# Patient Record
Sex: MALE | Race: WHITE | NOT HISPANIC OR LATINO | Employment: FULL TIME | URBAN - METROPOLITAN AREA
[De-identification: names, ages, dates, MRNs, and addresses within clinical notes are randomized per-mention and may not be internally consistent; named-entity substitution may affect disease eponyms.]

---

## 2024-04-01 ENCOUNTER — OFFICE VISIT (OUTPATIENT)
Dept: URGENT CARE | Facility: CLINIC | Age: 39
End: 2024-04-01
Payer: COMMERCIAL

## 2024-04-01 VITALS
DIASTOLIC BLOOD PRESSURE: 86 MMHG | OXYGEN SATURATION: 97 % | BODY MASS INDEX: 29.8 KG/M2 | SYSTOLIC BLOOD PRESSURE: 148 MMHG | HEIGHT: 72 IN | RESPIRATION RATE: 18 BRPM | TEMPERATURE: 98 F | HEART RATE: 95 BPM | WEIGHT: 220 LBS

## 2024-04-01 DIAGNOSIS — J03.90 ACUTE TONSILLITIS, UNSPECIFIED ETIOLOGY: Primary | ICD-10-CM

## 2024-04-01 DIAGNOSIS — Z76.0 MEDICINE REFILL: ICD-10-CM

## 2024-04-01 PROBLEM — E78.2 MIXED HYPERLIPIDEMIA: Status: ACTIVE | Noted: 2021-10-27

## 2024-04-01 PROBLEM — G47.33 OSA (OBSTRUCTIVE SLEEP APNEA): Status: ACTIVE | Noted: 2023-11-07

## 2024-04-01 PROBLEM — K76.0 HEPATIC STEATOSIS: Status: ACTIVE | Noted: 2022-04-27

## 2024-04-01 PROBLEM — I10 PRIMARY HYPERTENSION: Status: ACTIVE | Noted: 2021-10-27

## 2024-04-01 PROBLEM — E55.9 VITAMIN D DEFICIENCY: Status: ACTIVE | Noted: 2022-03-15

## 2024-04-01 PROBLEM — R74.01 ELEVATED TRANSAMINASE LEVEL: Status: ACTIVE | Noted: 2021-10-27

## 2024-04-01 PROCEDURE — 99283 EMERGENCY DEPT VISIT LOW MDM: CPT | Performed by: NURSE PRACTITIONER

## 2024-04-01 PROCEDURE — G0382 LEV 3 HOSP TYPE B ED VISIT: HCPCS | Performed by: NURSE PRACTITIONER

## 2024-04-01 RX ORDER — PANTOPRAZOLE SODIUM 20 MG/1
20 TABLET, DELAYED RELEASE ORAL DAILY
Qty: 30 TABLET | Refills: 1 | Status: SHIPPED | OUTPATIENT
Start: 2024-04-01

## 2024-04-01 RX ORDER — PREDNISONE 20 MG/1
40 TABLET ORAL DAILY
Qty: 10 TABLET | Refills: 0 | Status: SHIPPED | OUTPATIENT
Start: 2024-04-01 | End: 2024-04-06

## 2024-04-01 RX ORDER — AMOXICILLIN AND CLAVULANATE POTASSIUM 875; 125 MG/1; MG/1
1 TABLET, FILM COATED ORAL EVERY 12 HOURS SCHEDULED
Qty: 20 TABLET | Refills: 0 | Status: SHIPPED | OUTPATIENT
Start: 2024-04-01 | End: 2024-04-11

## 2024-04-01 RX ORDER — LISINOPRIL 5 MG/1
5 TABLET ORAL DAILY
COMMUNITY
Start: 2023-11-16 | End: 2024-05-14

## 2024-04-01 NOTE — PATIENT INSTRUCTIONS
Tonsillitis   AMBULATORY CARE:   Tonsillitis  is inflammation of your tonsils. Tonsils are the lumps of tissue on both sides of the back of your throat. Tonsils are part of your immune system. They help you fight infections. Tonsillitis is usually caused by bacteria or a virus. Recurrent tonsillitis is tonsillitis that happens at least 5 times in 1 year. Chronic tonsillitis lasts 3 months or longer.       Common symptoms include the following:   Severe sore throat    Red, swollen tonsils    Painful swallowing    Fever and chills    Bad breath    White spots on the tonsils    Call your local emergency number (911 in the ) if:   You have trouble breathing because your tonsils are swollen.      Seek care immediately if:   You are not able to eat or drink because of the pain.    Your voice changes.    You have increased swelling or jaw pain, or you have trouble opening your mouth.    You have a stiff neck.    You have not urinated in 12 hours or are very weak or tired.    You have trouble sleeping and wake up trying to catch your breath.    Call your doctor if:   You have a fever.    Your pain gets worse or does not get better after you take pain medicine.    Your sore throat is not better after you have finished antibiotic treatment.    You have questions or concerns about your condition or care.    Treatment for tonsillitis  may include any of the following:  Acetaminophen  decreases pain and fever. It is available without a doctor's order. Ask how much to take and how often to take it. Follow directions. Read the labels of all other medicines you are using to see if they also contain acetaminophen, or ask your doctor or pharmacist. Acetaminophen can cause liver damage if not taken correctly.    NSAIDs , such as ibuprofen, help decrease swelling, pain, and fever. This medicine is available with or without a doctor's order. NSAIDs can cause stomach bleeding or kidney problems in certain people. If you take blood  thinner medicine, always ask your healthcare provider if NSAIDs are safe for you. Always read the medicine label and follow directions.    Antibiotics  help treat a bacterial infection.    Steroids  may be given for a short time to relieve swelling.    A tonsillectomy  is surgery to remove your tonsils. You may need surgery if you have chronic or recurrent tonsillitis. Surgery is also done if antibiotics are not getting rid of your tonsillitis.    Rest when you feel it is needed:  Slowly start to do more each day.  Drink liquids as directed:  You may need to drink more liquid than usual to help prevent dehydration. Ask how much liquid to drink each day and which liquids are best for you.  Gargle with warm salt water:  This may help decrease throat pain. Mix 1 teaspoon of salt in 8 ounces of warm water. Ask how often you should do this.  Prevent tonsillitis:  Bacteria and viruses that lead to tonsillitis can spread through coughing, sneezing, or touching. The following can help prevent infections:  Wash your hands often.  Wash your hands several times each day. Wash after you use the bathroom, change a child's diaper, and before you prepare or eat food. Use soap and water. Rinse with warm, running water for several seconds. Then dry your hands with a clean towel or paper towel. Use hand  that contains alcohol if soap and water are not available. Do not touch your eyes, nose, or mouth without washing your hands first.         Cover a sneeze or cough.  Use a tissue that covers your mouth and nose. Throw the tissue away immediately. If you do not have a tissue, use the bend of your elbow. Then wash your hands well or use a hand .    Prevent person-to-person spread of germs.  Do not share food or drinks with anyone. Return to work, school, or other activities as directed. Your provider may want you to wait until your fever is gone for at least 24 hours.    Ask about vaccines you may need.  Vaccines help  protect you from some bacterial and viral infections. Get the influenza (flu) vaccine as soon as recommended each year, usually in September or October. Get a COVID-19 vaccine and recommended boosters. Get a pneumonia vaccine, if recommended. The pneumonia vaccine is usually recommended every 5 years. Your provider will tell you which other vaccines you need, and when to get them.    Follow up with your doctor as directed:  Write down your questions so you remember to ask them during your visits.  © Copyright [employer] 2023 Information is for End User's use only and may not be sold, redistributed or otherwise used for commercial purposes.  The above information is an  only. It is not intended as medical advice for individual conditions or treatments. Talk to your doctor, nurse or pharmacist before following any medical regimen to see if it is safe and effective for you.

## 2024-04-01 NOTE — PROGRESS NOTES
[facility]        NAME: Adan Carpenter is a 38 y.o. male  : 1985    MRN: [id number]  DATE: 2024  TIME: 4:57 PM      Assessment and Plan     Acute tonsillitis, unspecified etiology [J03.90]  1. Acute tonsillitis, unspecified etiology  amoxicillin-clavulanate (AUGMENTIN) 875-125 mg per tablet    predniSONE 20 mg tablet      2. Medicine refill  pantoprazole (PROTONIX) 20 mg tablet            Patient Instructions     Patient Instructions   Tonsillitis   AMBULATORY CARE:   Tonsillitis  is inflammation of your tonsils. Tonsils are the lumps of tissue on both sides of the back of your throat. Tonsils are part of your immune system. They help you fight infections. Tonsillitis is usually caused by bacteria or a virus. Recurrent tonsillitis is tonsillitis that happens at least 5 times in 1 year. Chronic tonsillitis lasts 3 months or longer.       Common symptoms include the following:   Severe sore throat    Red, swollen tonsils    Painful swallowing    Fever and chills    Bad breath    White spots on the tonsils    Call your local emergency number (911 in the ) if:   You have trouble breathing because your tonsils are swollen.      Seek care immediately if:   You are not able to eat or drink because of the pain.    Your voice changes.    You have increased swelling or jaw pain, or you have trouble opening your mouth.    You have a stiff neck.    You have not urinated in 12 hours or are very weak or tired.    You have trouble sleeping and wake up trying to catch your breath.    Call your doctor if:   You have a fever.    Your pain gets worse or does not get better after you take pain medicine.    Your sore throat is not better after you have finished antibiotic treatment.    You have questions or concerns about your condition or care.    Treatment for tonsillitis  may include any of the following:  Acetaminophen  decreases pain and fever. It is available without a doctor's order. Ask how much to  take and how often to take it. Follow directions. Read the labels of all other medicines you are using to see if they also contain acetaminophen, or ask your doctor or pharmacist. Acetaminophen can cause liver damage if not taken correctly.    NSAIDs , such as ibuprofen, help decrease swelling, pain, and fever. This medicine is available with or without a doctor's order. NSAIDs can cause stomach bleeding or kidney problems in certain people. If you take blood thinner medicine, always ask your healthcare provider if NSAIDs are safe for you. Always read the medicine label and follow directions.    Antibiotics  help treat a bacterial infection.    Steroids  may be given for a short time to relieve swelling.    A tonsillectomy  is surgery to remove your tonsils. You may need surgery if you have chronic or recurrent tonsillitis. Surgery is also done if antibiotics are not getting rid of your tonsillitis.    Rest when you feel it is needed:  Slowly start to do more each day.  Drink liquids as directed:  You may need to drink more liquid than usual to help prevent dehydration. Ask how much liquid to drink each day and which liquids are best for you.  Gargle with warm salt water:  This may help decrease throat pain. Mix 1 teaspoon of salt in 8 ounces of warm water. Ask how often you should do this.  Prevent tonsillitis:  Bacteria and viruses that lead to tonsillitis can spread through coughing, sneezing, or touching. The following can help prevent infections:  Wash your hands often.  Wash your hands several times each day. Wash after you use the bathroom, change a child's diaper, and before you prepare or eat food. Use soap and water. Rinse with warm, running water for several seconds. Then dry your hands with a clean towel or paper towel. Use hand  that contains alcohol if soap and water are not available. Do not touch your eyes, nose, or mouth without washing your hands first.         Cover a sneeze or cough.   Use a tissue that covers your mouth and nose. Throw the tissue away immediately. If you do not have a tissue, use the bend of your elbow. Then wash your hands well or use a hand .    Prevent person-to-person spread of germs.  Do not share food or drinks with anyone. Return to work, school, or other activities as directed. Your provider may want you to wait until your fever is gone for at least 24 hours.    Ask about vaccines you may need.  Vaccines help protect you from some bacterial and viral infections. Get the influenza (flu) vaccine as soon as recommended each year, usually in September or October. Get a COVID-19 vaccine and recommended boosters. Get a pneumonia vaccine, if recommended. The pneumonia vaccine is usually recommended every 5 years. Your provider will tell you which other vaccines you need, and when to get them.    Follow up with your doctor as directed:  Write down your questions so you remember to ask them during your visits.  © Copyright [employer] 2023 Information is for End User's use only and may not be sold, redistributed or otherwise used for commercial purposes.  The above information is an  only. It is not intended as medical advice for individual conditions or treatments. Talk to your doctor, nurse or pharmacist before following any medical regimen to see if it is safe and effective for you.      Follow up with PCP in 3-5 days.  Proceed to  ER if symptoms worsen.    Chief Complaint     Chief Complaint   Patient presents with    Earache    Cold Like Symptoms     Sore throat, earache, body aches, fever, congestions all came on Friday           History of Present Illness     On Friday felt like he was coming down with something.  Had mild symptoms over the weekend.  Overnight last night felt worse--subjective fever, chills, congestion, body aches, sore throat, coughing, slight diarrhea.    Is in the process of switching PCPs due to insurance coverage changing.  Has an  upcoming new patient appointment.  Was on protonix 20 mg daily with good control of GERD but has been out with increasing symptoms help but not fully controlled by prn Tums.        Review of Systems     Review of Systems   Constitutional:  Positive for chills, fatigue and fever.   HENT:  Positive for congestion, ear pain and sore throat.    Respiratory:  Positive for cough, chest tightness, shortness of breath and wheezing.         Current smoker; remote hx bronchitis--last time a few years ago with covid 2021.  No hx asthma   Gastrointestinal:  Positive for diarrhea. Negative for nausea and vomiting.   Musculoskeletal:  Positive for myalgias.   All other systems reviewed and are negative.        Current Medications       Current Outpatient Medications:     amoxicillin-clavulanate (AUGMENTIN) 875-125 mg per tablet, Take 1 tablet by mouth every 12 (twelve) hours for 10 days, Disp: 20 tablet, Rfl: 0    lisinopril (ZESTRIL) 5 mg tablet, Take 5 mg by mouth daily, Disp: , Rfl:     pantoprazole (PROTONIX) 20 mg tablet, Take 1 tablet (20 mg total) by mouth daily, Disp: 30 tablet, Rfl: 1    predniSONE 20 mg tablet, Take 2 tablets (40 mg total) by mouth daily for 5 days, Disp: 10 tablet, Rfl: 0    Current Allergies     Allergies as of 04/01/2024    (No Known Allergies)              The following portions of the patient's history were reviewed and updated as appropriate: allergies, current medications, past family history, past medical history, past social history, past surgical history and problem list.     History reviewed. No pertinent past medical history.    History reviewed. No pertinent surgical history.    History reviewed. No pertinent family history.      Medications have been verified.        Objective     /86   Pulse 95   Temp 98 °F (36.7 °C)   Resp 18   Ht 6' (1.829 m)   Wt 99.8 kg (220 lb)   SpO2 97%   BMI 29.84 kg/m²   No LMP for male patient.         Physical Exam     Physical Exam  Vitals and  nursing note reviewed.   Constitutional:       General: He is not in acute distress.     Appearance: Normal appearance. He is well-developed and well-groomed. He is ill-appearing (mild). He is not toxic-appearing or diaphoretic.   HENT:      Head: Normocephalic and atraumatic.      Right Ear: Hearing, ear canal and external ear normal. No decreased hearing noted. Tenderness present. A middle ear effusion (slight) is present. Tympanic membrane is not erythematous or bulging.      Left Ear: Hearing, ear canal and external ear normal. No decreased hearing noted. No tenderness. A middle ear effusion (slight) is present. Tympanic membrane is not erythematous or bulging.      Nose: Mucosal edema and congestion present.      Right Sinus: No maxillary sinus tenderness or frontal sinus tenderness.      Left Sinus: No maxillary sinus tenderness or frontal sinus tenderness.      Mouth/Throat:      Mouth: Mucous membranes are moist.      Pharynx: Oropharynx is clear. Uvula midline. Posterior oropharyngeal erythema (very red) present. No oropharyngeal exudate.      Tonsils: No tonsillar exudate or tonsillar abscesses. 2+ on the right. 1+ on the left.   Eyes:      Pupils: Pupils are equal, round, and reactive to light.   Cardiovascular:      Rate and Rhythm: Normal rate and regular rhythm. No extrasystoles are present.     Heart sounds: Normal heart sounds, S1 normal and S2 normal. No murmur heard.     No friction rub. No gallop.   Pulmonary:      Effort: Pulmonary effort is normal. No tachypnea, bradypnea, accessory muscle usage, prolonged expiration, respiratory distress or retractions.      Breath sounds: Normal breath sounds and air entry. No stridor or decreased air movement. No decreased breath sounds, wheezing (clear, good air movement), rhonchi or rales.   Chest:      Chest wall: No tenderness.   Abdominal:      General: Bowel sounds are normal. There is no distension.      Palpations: Abdomen is soft.      Tenderness:  There is no abdominal tenderness. There is no guarding or rebound.   Musculoskeletal:         General: Normal range of motion.      Cervical back: Normal range of motion and neck supple.   Lymphadenopathy:      Cervical: Cervical adenopathy (R>L) present.   Skin:     General: Skin is warm and dry.      Capillary Refill: Capillary refill takes less than 2 seconds.   Neurological:      General: No focal deficit present.      Mental Status: He is alert and oriented to person, place, and time.   Psychiatric:         Mood and Affect: Mood normal.         Behavior: Behavior normal. Behavior is cooperative.         Thought Content: Thought content normal.         Judgment: Judgment normal.

## 2024-04-16 ENCOUNTER — OFFICE VISIT (OUTPATIENT)
Dept: FAMILY MEDICINE CLINIC | Facility: CLINIC | Age: 39
End: 2024-04-16
Payer: COMMERCIAL

## 2024-04-16 VITALS
HEART RATE: 110 BPM | HEIGHT: 72 IN | WEIGHT: 225 LBS | DIASTOLIC BLOOD PRESSURE: 120 MMHG | BODY MASS INDEX: 30.48 KG/M2 | OXYGEN SATURATION: 98 % | SYSTOLIC BLOOD PRESSURE: 170 MMHG | TEMPERATURE: 97.2 F

## 2024-04-16 DIAGNOSIS — I10 PRIMARY HYPERTENSION: Primary | ICD-10-CM

## 2024-04-16 DIAGNOSIS — Z76.89 ENCOUNTER TO ESTABLISH CARE WITH NEW DOCTOR: ICD-10-CM

## 2024-04-16 DIAGNOSIS — R05.3 CHRONIC COUGH: ICD-10-CM

## 2024-04-16 DIAGNOSIS — Z72.0 TOBACCO ABUSE: ICD-10-CM

## 2024-04-16 DIAGNOSIS — K21.00 GASTROESOPHAGEAL REFLUX DISEASE WITH ESOPHAGITIS WITHOUT HEMORRHAGE: ICD-10-CM

## 2024-04-16 DIAGNOSIS — G47.33 OSA (OBSTRUCTIVE SLEEP APNEA): ICD-10-CM

## 2024-04-16 PROCEDURE — 99204 OFFICE O/P NEW MOD 45 MIN: CPT | Performed by: FAMILY MEDICINE

## 2024-04-16 RX ORDER — ALBUTEROL SULFATE 90 UG/1
2 AEROSOL, METERED RESPIRATORY (INHALATION) EVERY 6 HOURS PRN
Qty: 8.5 G | Refills: 0 | Status: SHIPPED | OUTPATIENT
Start: 2024-04-16

## 2024-04-16 RX ORDER — IBUPROFEN 200 MG
TABLET ORAL
COMMUNITY

## 2024-04-16 RX ORDER — LOSARTAN POTASSIUM 25 MG/1
25 TABLET ORAL DAILY
Qty: 90 TABLET | Refills: 0 | Status: SHIPPED | OUTPATIENT
Start: 2024-04-16

## 2024-04-16 NOTE — PROGRESS NOTES
Name: Adan Carpenter      : 1985      MRN: [id number]  Encounter Provider: Joseph Coto MD  Encounter Date: 2024   Encounter department: [facility]    Assessment & Plan     1. Primary hypertension  -     losartan (COZAAR) 25 mg tablet; Take 1 tablet (25 mg total) by mouth daily    2. Gastroesophageal reflux disease with esophagitis without hemorrhage  -     Ambulatory Referral to Gastroenterology; Future    3. Chronic cough  -     XR chest pa & lateral; Future; Expected date: 2024  -     albuterol (ProAir HFA) 90 mcg/act inhaler; Inhale 2 puffs every 6 (six) hours as needed for wheezing    4. TORI (obstructive sleep apnea)  Assessment & Plan:  Not on CPAP yet.  He reports DME company waiting to hear from insurance.      5. Tobacco abuse    6. Encounter to establish care with new doctor    Unclear etiology to patient's cough but I suspect likely related to lisinopril.  Recommended he stop lisinopril we will switch to losartan.  Blood pressure is uncontrolled today so we will continue to monitor and uptitrate meds until blood pressure at goal.  He also has significant history of acid reflux as well as tobacco use so his cough may be related to those chronic conditions.  I have advised if cough does not resolve in 2 weeks after stopping lisinopril that he get chest x-ray and also follow-up with GI for further evaluation and endoscopy.  Continue Protonix 20 mg daily as he reports symptoms controlled.  He takes Advil frequently for arthralgia advised to limit NSAID use .     There is some wheezing on exam today I also suspect he has undiagnosed COPD.  Will trial albuterol as needed.       Subjective      Patient presents to the office today to establish care.  Has history of hypertension on lisinopril.  He also has concerns for cough today.  Has had cough for about 6 months.    He reports the cough is dry and he is not really sure what is causing it.  Nothing really makes the  cough worse and nothing really makes the cough better.  He does have a significant smoking history and smokes both cigarettes and vapes.  Reports has been smoking for about 20 years.  He does want to quit and has had tried several medications both patches and oral medications to quit but that did not really help.  He does admit some shortness of breath at times.  He has never been on inhaler.  His cough did start shortly after lisinopril.  He has history of acid reflux and he says it gets really bad at times but ever since getting on pantoprazole 20 mg daily his symptoms are much better.  He said he is waking up in the past with regurgitated food and acid reflux with burning throat and has vomited.  Denies any hematemesis.      Review of Systems   All other systems reviewed and are negative.      Current Outpatient Medications on File Prior to Visit   Medication Sig    ibuprofen (MOTRIN) 200 mg tablet Take by mouth    pantoprazole (PROTONIX) 20 mg tablet Take 1 tablet (20 mg total) by mouth daily    [DISCONTINUED] lisinopril (ZESTRIL) 5 mg tablet Take 5 mg by mouth daily       Objective     BP (!) 170/120 (BP Location: Left arm, Patient Position: Sitting, Cuff Size: Standard)   Pulse (!) 110   Temp (!) 97.2 °F (36.2 °C) (Tympanic)   Ht 6' (1.829 m)   Wt 102 kg (225 lb)   SpO2 98%   BMI 30.52 kg/m²     Physical Exam  Vitals and nursing note reviewed.   Constitutional:       General: He is not in acute distress.     Appearance: Normal appearance. He is not ill-appearing, toxic-appearing or diaphoretic.   HENT:      Nose: Nose normal.      Mouth/Throat:      Mouth: Mucous membranes are moist.      Pharynx: Oropharynx is clear. No oropharyngeal exudate or posterior oropharyngeal erythema.   Eyes:      General:         Right eye: No discharge.         Left eye: No discharge.      Extraocular Movements: Extraocular movements intact.      Conjunctiva/sclera: Conjunctivae normal.   Cardiovascular:      Rate and  Rhythm: Normal rate.   Pulmonary:      Effort: Pulmonary effort is normal.      Breath sounds: Wheezing present.   Musculoskeletal:      Cervical back: Normal range of motion and neck supple. No rigidity or tenderness.   Lymphadenopathy:      Cervical: No cervical adenopathy.   Neurological:      Mental Status: He is alert and oriented to person, place, and time.   Psychiatric:         Mood and Affect: Mood normal.         Behavior: Behavior normal.         Thought Content: Thought content normal.         Judgment: Judgment normal.       Joseph Coto MD

## 2024-05-16 ENCOUNTER — TELEPHONE (OUTPATIENT)
Dept: FAMILY MEDICINE CLINIC | Facility: CLINIC | Age: 39
End: 2024-05-16

## 2024-05-16 DIAGNOSIS — Z76.0 MEDICINE REFILL: ICD-10-CM

## 2024-05-16 RX ORDER — PANTOPRAZOLE SODIUM 20 MG/1
20 TABLET, DELAYED RELEASE ORAL DAILY
Qty: 90 TABLET | Refills: 1 | Status: SHIPPED | OUTPATIENT
Start: 2024-05-16

## 2024-05-16 NOTE — TELEPHONE ENCOUNTER
Patient was in the office asking if it would be possible to get a refill of his pantoprazole. He stated that it was last filled by urgent care but that he will be out before hios appointment. Is it possible to have his medication filled.

## 2024-05-17 ENCOUNTER — TELEPHONE (OUTPATIENT)
Dept: FAMILY MEDICINE CLINIC | Facility: CLINIC | Age: 39
End: 2024-05-17

## 2024-05-20 ENCOUNTER — OFFICE VISIT (OUTPATIENT)
Dept: URGENT CARE | Facility: CLINIC | Age: 39
End: 2024-05-20
Payer: COMMERCIAL

## 2024-05-20 VITALS
OXYGEN SATURATION: 97 % | BODY MASS INDEX: 30.48 KG/M2 | TEMPERATURE: 97.6 F | SYSTOLIC BLOOD PRESSURE: 140 MMHG | HEART RATE: 116 BPM | WEIGHT: 225 LBS | HEIGHT: 72 IN | DIASTOLIC BLOOD PRESSURE: 86 MMHG | RESPIRATION RATE: 18 BRPM

## 2024-05-20 DIAGNOSIS — J02.0 STREP PHARYNGITIS: Primary | ICD-10-CM

## 2024-05-20 DIAGNOSIS — J02.9 SORE THROAT: ICD-10-CM

## 2024-05-20 LAB — S PYO AG THROAT QL: POSITIVE

## 2024-05-20 PROCEDURE — 99283 EMERGENCY DEPT VISIT LOW MDM: CPT | Performed by: ORTHOPAEDIC SURGERY

## 2024-05-20 PROCEDURE — G0382 LEV 3 HOSP TYPE B ED VISIT: HCPCS | Performed by: ORTHOPAEDIC SURGERY

## 2024-05-20 PROCEDURE — 87880 STREP A ASSAY W/OPTIC: CPT | Performed by: ORTHOPAEDIC SURGERY

## 2024-05-20 RX ORDER — AMOXICILLIN 500 MG/1
500 CAPSULE ORAL EVERY 12 HOURS SCHEDULED
Qty: 20 CAPSULE | Refills: 0 | Status: SHIPPED | OUTPATIENT
Start: 2024-05-20 | End: 2024-05-30

## 2024-05-20 NOTE — PROGRESS NOTES
"  [facility]        NAME: Adan Carpenter is a 38 y.o. male  : 1985    MRN: [id number]  DATE: May 20, 2024  TIME: 1:37 PM    Assessment and Plan   Strep pharyngitis [J02.0]  1. Strep pharyngitis  amoxicillin (AMOXIL) 500 mg capsule      2. Sore throat  POCT rapid ANTIGEN strepA        POCT strep positive.    Patient Instructions     Take antibiotics as prescribed  For pain relief you may try:  Warm water and salt gargles  Chloraseptic spray  Cepacol lozenges  \"Throat Coat\" tea  Over-the-counter Tylenol/ibuprofen for pain and fever  Stay well hydrated and get plenty of rest  Following completion of antibiotics it may be beneficial to throw out your toothbrush for a new one as it is possible to re-infect yourself  Follow up with your PCP in 3-5 days  Proceed to ER if symptoms worsen        If tests are performed, our office will contact you with results only if changes need to made to the care plan discussed with you at the visit. [facility]    Chief Complaint     Chief Complaint   Patient presents with    Sore Throat     Patient c/o fever, sore throat, nausea and body aches that started on Saturday.         History of Present Illness       38 YOM presents to the urgent care for evaluation of a sore throat, sweats/fever, nausea. Symptoms started Saturday and have been worsening. The patient notes a loss of appetite. Swollen, painful throat and glands. He reports there was contact with illness through work.         Review of Systems   Review of Systems   Constitutional:  Positive for appetite change, chills, fatigue and fever.   HENT:  Positive for sore throat. Negative for ear pain.    Eyes:  Negative for pain and visual disturbance.   Respiratory:  Negative for cough and shortness of breath.    Cardiovascular:  Negative for chest pain and palpitations.   Gastrointestinal:  Positive for nausea. Negative for abdominal pain and vomiting.   Genitourinary:  " Negative for dysuria and hematuria.   Musculoskeletal:  Positive for myalgias. Negative for arthralgias and back pain.   Skin:  Negative for color change and rash.   Neurological:  Negative for dizziness, seizures, syncope and headaches.   All other systems reviewed and are negative.        Current Medications       Current Outpatient Medications:     amoxicillin (AMOXIL) 500 mg capsule, Take 1 capsule (500 mg total) by mouth every 12 (twelve) hours for 10 days, Disp: 20 capsule, Rfl: 0    ibuprofen (MOTRIN) 200 mg tablet, Take by mouth, Disp: , Rfl:     losartan (COZAAR) 25 mg tablet, Take 1 tablet (25 mg total) by mouth daily, Disp: 90 tablet, Rfl: 0    pantoprazole (PROTONIX) 20 mg tablet, Take 1 tablet (20 mg total) by mouth daily, Disp: 90 tablet, Rfl: 1    albuterol (ProAir HFA) 90 mcg/act inhaler, Inhale 2 puffs every 6 (six) hours as needed for wheezing (Patient not taking: Reported on 5/20/2024), Disp: 8.5 g, Rfl: 0    Current Allergies     Allergies as of 05/20/2024    (No Known Allergies)            The following portions of the patient's history were reviewed and updated as appropriate: allergies, current medications, past family history, past medical history, past social history, past surgical history and problem list.     History reviewed. No pertinent past medical history.    History reviewed. No pertinent surgical history.    No family history on file.      Medications have been verified.        Objective   /86   Pulse (!) 116   Temp 97.6 °F (36.4 °C) (Temporal)   Resp 18   Ht 6' (1.829 m)   Wt 102 kg (225 lb)   SpO2 97%   BMI 30.52 kg/m²        Physical Exam     Physical Exam  Vitals and nursing note reviewed.   Constitutional:       General: He is not in acute distress.     Appearance: Normal appearance. He is diaphoretic. He is not ill-appearing.   HENT:      Head: Normocephalic and atraumatic.      Right Ear: Tympanic membrane normal.      Left Ear: Tympanic membrane normal.       Nose: Nose normal.      Mouth/Throat:      Mouth: Mucous membranes are moist.      Pharynx: Oropharynx is clear. Uvula midline. Posterior oropharyngeal erythema present. No oropharyngeal exudate.      Tonsils: Tonsillar exudate present. 1+ on the right. 1+ on the left.   Eyes:      Extraocular Movements: Extraocular movements intact.      Pupils: Pupils are equal, round, and reactive to light.   Cardiovascular:      Rate and Rhythm: Normal rate and regular rhythm.      Pulses: Normal pulses.      Heart sounds: Normal heart sounds. No murmur heard.  Pulmonary:      Effort: Pulmonary effort is normal. No respiratory distress.      Breath sounds: Normal breath sounds. No wheezing or rhonchi.   Abdominal:      Palpations: Abdomen is soft.      Tenderness: There is no abdominal tenderness.   Musculoskeletal:         General: Normal range of motion.      Cervical back: Normal range of motion.   Lymphadenopathy:      Cervical: Cervical adenopathy present.   Skin:     General: Skin is warm.      Capillary Refill: Capillary refill takes less than 2 seconds.   Neurological:      General: No focal deficit present.      Mental Status: He is alert and oriented to person, place, and time.   Psychiatric:         Mood and Affect: Mood normal.         Behavior: Behavior normal.

## 2024-05-20 NOTE — PATIENT INSTRUCTIONS
"Take antibiotics as prescribed  For pain relief you may try:  Warm water and salt gargles  Chloraseptic spray  Cepacol lozenges  \"Throat Coat\" tea  Over-the-counter Tylenol/ibuprofen for pain and fever  Stay well hydrated and get plenty of rest  Following completion of antibiotics it may be beneficial to throw out your toothbrush for a new one as it is possible to re-infect yourself  Follow up with your PCP in 3-5 days  Proceed to ER if symptoms worsen      "

## 2024-05-20 NOTE — LETTER
May 20, 2024     Patient: Adan Carpenter   YOB: 1985   Date of Visit: 5/20/2024       To Whom It May Concern:    It is my medical opinion that Adan Carpenter may return to work on Wednesday, 5/22/2024 .    If you have any questions or concerns, please don't hesitate to call.         Sincerely,        Saira Rushing PA-C    CC: No Recipients

## 2024-05-30 ENCOUNTER — PATIENT OUTREACH (OUTPATIENT)
Dept: OTHER | Facility: CLINIC | Age: 39
End: 2024-05-30

## 2024-05-30 ENCOUNTER — OFFICE VISIT (OUTPATIENT)
Dept: FAMILY MEDICINE CLINIC | Facility: CLINIC | Age: 39
End: 2024-05-30
Payer: COMMERCIAL

## 2024-05-30 DIAGNOSIS — Z00.00 ANNUAL PHYSICAL EXAM: Primary | ICD-10-CM

## 2024-05-30 DIAGNOSIS — R05.3 CHRONIC COUGH: ICD-10-CM

## 2024-05-30 DIAGNOSIS — Z72.0 TOBACCO ABUSE: ICD-10-CM

## 2024-05-30 DIAGNOSIS — I10 PRIMARY HYPERTENSION: ICD-10-CM

## 2024-05-30 DIAGNOSIS — K21.00 GASTROESOPHAGEAL REFLUX DISEASE WITH ESOPHAGITIS WITHOUT HEMORRHAGE: ICD-10-CM

## 2024-05-30 PROCEDURE — 99395 PREV VISIT EST AGE 18-39: CPT | Performed by: FAMILY MEDICINE

## 2024-05-30 PROCEDURE — 99214 OFFICE O/P EST MOD 30 MIN: CPT | Performed by: FAMILY MEDICINE

## 2024-05-30 RX ORDER — LOSARTAN POTASSIUM 50 MG/1
50 TABLET ORAL DAILY
Qty: 90 TABLET | Refills: 0 | Status: SHIPPED | OUTPATIENT
Start: 2024-05-30

## 2024-05-30 NOTE — PROGRESS NOTES
Ambulatory Visit  Name: Adan Carpenter      : 1985      MRN: [id number]  Encounter Provider: Joseph Coto MD  Encounter Date: 2024   Encounter department: FAMILY [facility]ACTICE AT [place]    Assessment & Plan   1. Annual physical exam  2. Primary hypertension  -     losartan (COZAAR) 50 mg tablet; Take 1 tablet (50 mg total) by mouth daily  3. Tobacco abuse  -     Ambulatory Referral to Smoking Cessation Program; Future  4. Chronic cough  5. Gastroesophageal reflux disease with esophagitis without hemorrhage    Patient's chronic cough has resolved after switching lisinopril to losartan.  At previous visit we ordered chest x-ray which he did not complete because the cough went away.  I told him it is okay to hold on chest x-ray for now since cough is resolved but recommended getting it done if it comes back.  He does have significant smoking history and  I do suspect there is some underlying mild  COPD here but sxs are stable.  I did send albuterol to the pharmacy at last visit but he did not  inhaler yet.  He is motivated to quit smoking but is going to be starting CPAP soon for sleep apnea.  He says he may take 1 thing at a time and start CPAP and then focus on tobacco cessation and improving his diet and lifestyle.  Blood pressure is improved but still slightly  above goal.  I believe CPAP , smoking cessation and improvement in his lifestyle will resolve this.      GERD stable on pantoprazole 20 mg daily.      History of Present Illness     Patient returns office today for annual exam and follow-up from initial office visit.  Last office visit we were working up his chronic cough.  He was on lisinopril and switch to losartan.  We also trialed albuterol inhaler and ordered chest x-ray.  He says his cough went away after switching the lisinopril so he did not get the chest x-ray.  He did go to the pharmacy but did not  the albuterol because he said it was 20 bucks and did not want to  pay at that time and wanted to see if he actually needed.  He does have some intermittent shortness of breath but says he is not very active and smokes a lot of cigarettes.  He is motivated to quit and get in better shape.  He does not really exercise too much and has  a sedentary job.  He is going to be starting CPAP soon.  He is not having issues with the losartan.  His acid reflux is also stable.  He is on pantoprazole 20 mg daily.        Review of Systems   All other systems reviewed and are negative.      Objective     /82 (BP Location: Left arm, Patient Position: Sitting, Cuff Size: Standard)   Pulse 88   Temp (!) 97.4 °F (36.3 °C) (Tympanic)   Ht 6' (1.829 m)   Wt 104 kg (229 lb)   SpO2 97%   BMI 31.06 kg/m²     Physical Exam  Vitals and nursing note reviewed.   Constitutional:       General: He is not in acute distress.     Appearance: Normal appearance. He is well-developed. He is not ill-appearing, toxic-appearing or diaphoretic.   HENT:      Head: Normocephalic and atraumatic.      Right Ear: External ear normal.      Left Ear: External ear normal.      Nose: Nose normal.      Mouth/Throat:      Mouth: Mucous membranes are moist.      Pharynx: Oropharynx is clear. No oropharyngeal exudate or posterior oropharyngeal erythema.   Eyes:      General: No scleral icterus.        Right eye: No discharge.         Left eye: No discharge.      Extraocular Movements: Extraocular movements intact.      Conjunctiva/sclera: Conjunctivae normal.      Pupils: Pupils are equal, round, and reactive to light.   Cardiovascular:      Rate and Rhythm: Normal rate and regular rhythm.      Pulses: Normal pulses.      Heart sounds: Normal heart sounds. No murmur heard.  Pulmonary:      Effort: Pulmonary effort is normal. No respiratory distress.      Breath sounds: Normal breath sounds.   Abdominal:      General: There is no distension.      Palpations: Abdomen is soft. There is no mass.      Tenderness: There is no  abdominal tenderness. There is no guarding or rebound.      Hernia: No hernia is present.   Musculoskeletal:         General: Normal range of motion.      Cervical back: Normal range of motion and neck supple. No rigidity or tenderness.      Right lower leg: No edema.      Left lower leg: No edema.   Lymphadenopathy:      Cervical: No cervical adenopathy.   Skin:     General: Skin is warm and dry.   Neurological:      General: No focal deficit present.      Mental Status: He is alert and oriented to person, place, and time.      Cranial Nerves: No cranial nerve deficit.      Motor: No weakness.      Gait: Gait normal.   Psychiatric:         Mood and Affect: Mood normal.         Behavior: Behavior normal.         Thought Content: Thought content normal.         Judgment: Judgment normal.       Administrative Statements

## 2024-06-01 VITALS
HEIGHT: 72 IN | TEMPERATURE: 97.4 F | OXYGEN SATURATION: 97 % | BODY MASS INDEX: 31.02 KG/M2 | DIASTOLIC BLOOD PRESSURE: 82 MMHG | WEIGHT: 229 LBS | SYSTOLIC BLOOD PRESSURE: 140 MMHG | HEART RATE: 88 BPM

## 2024-07-01 DIAGNOSIS — Z76.0 MEDICINE REFILL: ICD-10-CM

## 2024-07-02 ENCOUNTER — TELEPHONE (OUTPATIENT)
Age: 39
End: 2024-07-02

## 2024-07-02 DIAGNOSIS — G47.33 OSA (OBSTRUCTIVE SLEEP APNEA): Primary | ICD-10-CM

## 2024-07-02 RX ORDER — PANTOPRAZOLE SODIUM 20 MG/1
20 TABLET, DELAYED RELEASE ORAL DAILY
Qty: 100 TABLET | Refills: 1 | Status: SHIPPED | OUTPATIENT
Start: 2024-07-02

## 2024-07-02 NOTE — TELEPHONE ENCOUNTER
Patient reports that the provider that initially ordered his CPAP is no longer in his network so he would like a call back to advise if his PCP can continue to order his CPAP equipment through [facility] in [place]. Patient would like a call back to advise.

## 2024-09-03 ENCOUNTER — OFFICE VISIT (OUTPATIENT)
Dept: FAMILY MEDICINE CLINIC | Facility: CLINIC | Age: 39
End: 2024-09-03
Payer: COMMERCIAL

## 2024-09-03 VITALS
BODY MASS INDEX: 31.42 KG/M2 | SYSTOLIC BLOOD PRESSURE: 138 MMHG | WEIGHT: 232 LBS | DIASTOLIC BLOOD PRESSURE: 90 MMHG | HEART RATE: 98 BPM | OXYGEN SATURATION: 97 % | TEMPERATURE: 97.1 F | HEIGHT: 72 IN

## 2024-09-03 DIAGNOSIS — I10 PRIMARY HYPERTENSION: ICD-10-CM

## 2024-09-03 DIAGNOSIS — G47.33 OSA (OBSTRUCTIVE SLEEP APNEA): Primary | ICD-10-CM

## 2024-09-03 DIAGNOSIS — Z72.0 TOBACCO ABUSE: ICD-10-CM

## 2024-09-03 PROCEDURE — 99214 OFFICE O/P EST MOD 30 MIN: CPT | Performed by: FAMILY MEDICINE

## 2024-09-03 RX ORDER — LOSARTAN POTASSIUM 50 MG/1
50 TABLET ORAL DAILY
Qty: 90 TABLET | Refills: 0 | Status: SHIPPED | OUTPATIENT
Start: 2024-09-03

## 2024-09-03 NOTE — ASSESSMENT & PLAN NOTE
Still smoking.  Encouraged follow-up with [facility] tobacco cessation program as referred at previous visit

## 2024-09-03 NOTE — ASSESSMENT & PLAN NOTE
Improving but still borderline.  We will continue losartan 50 mg daily.  He will start being more compliant with CPAP which should improve his blood pressures as well.  He will monitor his blood pressures over the next few weeks and send me readings.

## 2024-09-03 NOTE — PROGRESS NOTES
Ambulatory Visit  Name: Adan Carpenter      : 1985      MRN: [id number]  Encounter Provider: Joseph Coto MD  Encounter Date: 9/3/2024   Encounter department: [facility]ILY PRACTICE AT [place]    Assessment & Plan   1. TORI (obstructive sleep apnea)  Assessment & Plan:  He is on CPAP but has been having difficulty with the machine.  Following with pulmonology [facility] for management of sleep apnea but has not been able to get a hold of anyone at the office.  Will place new referral to sleep medicine for TORI management.  Orders:  -     Ambulatory Referral to Sleep Medicine; Future  2. Primary hypertension  Assessment & Plan:  Improving but still borderline.  We will continue losartan 50 mg daily.  He will start being more compliant with CPAP which should improve his blood pressures as well.  He will monitor his blood pressures over the next few weeks and send me readings.  3. Tobacco abuse  Assessment & Plan:  Still smoking.  Encouraged follow-up with [facility] tobacco cessation program as referred at previous visit       History of Present Illness     Patient presents to the office today for follow-up.  Has history of hypertension and obstructive sleep apnea.  Has been having trouble with his CPAP.  Wakes up in the [place] and feels like he has to take it off.  He cannot breathe and it is blowing air down his mouth and the pressure is too high.  He tried calling his sleep apnea doctor but they have not responded to him.  He is on losartan for hypertension.  No issues after we increased losartan to 50 mg daily.  Does not measure blood pressures at home.  He still smoking cigarettes.  He did get a call from [facility] tobacco cessation as I referred him last visit but he did not set anything up.        Review of Systems   All other systems reviewed and are negative.      Objective     /90 (BP Location: Left arm, Patient Position: Sitting, Cuff Size: Standard)   Pulse 98    Temp (!) 97.1 °F (36.2 °C) (Tympanic)   Ht 6' (1.829 m)   Wt 105 kg (232 lb)   SpO2 97%   BMI 31.46 kg/m²     Physical Exam  Vitals and nursing note reviewed.   Constitutional:       General: He is not in acute distress.     Appearance: Normal appearance. He is well-developed. He is not ill-appearing, toxic-appearing or diaphoretic.   HENT:      Head: Normocephalic and atraumatic.   Eyes:      General:         Right eye: No discharge.         Left eye: No discharge.      Extraocular Movements: Extraocular movements intact.      Conjunctiva/sclera: Conjunctivae normal.   Cardiovascular:      Rate and Rhythm: Normal rate.      Pulses:           Dorsalis pedis pulses are 2+ on the right side and 2+ on the left side.        Posterior tibial pulses are 2+ on the right side and 2+ on the left side.   Pulmonary:      Effort: Pulmonary effort is normal.   Musculoskeletal:      Cervical back: Normal range of motion and neck supple.   Feet:      Right foot:      Skin integrity: No ulcer, skin breakdown, erythema, warmth, callus or dry skin.      Left foot:      Skin integrity: No ulcer, skin breakdown, erythema, warmth, callus or dry skin.   Skin:     General: Skin is dry.      Capillary Refill: Capillary refill takes less than 2 seconds.   Neurological:      Mental Status: He is alert and oriented to person, place, and time.   Psychiatric:         Mood and Affect: Mood normal.         Behavior: Behavior normal.         Thought Content: Thought content normal.         Judgment: Judgment normal.       Administrative Statements

## 2024-09-03 NOTE — ASSESSMENT & PLAN NOTE
He is on CPAP but has been having difficulty with the machine.  Following with pulmonology [facility] for management of sleep apnea but has not been able to get a hold of anyone at the office.  Will place new referral to sleep medicine for TORI management.

## 2024-11-22 DIAGNOSIS — Z76.0 MEDICINE REFILL: ICD-10-CM

## 2024-11-22 DIAGNOSIS — I10 PRIMARY HYPERTENSION: ICD-10-CM

## 2024-11-22 RX ORDER — LOSARTAN POTASSIUM 50 MG/1
50 TABLET ORAL DAILY
Qty: 90 TABLET | Refills: 0 | Status: SHIPPED | OUTPATIENT
Start: 2024-11-22

## 2024-11-22 RX ORDER — PANTOPRAZOLE SODIUM 20 MG/1
20 TABLET, DELAYED RELEASE ORAL DAILY
Qty: 100 TABLET | Refills: 1 | Status: SHIPPED | OUTPATIENT
Start: 2024-11-22

## 2025-01-24 ENCOUNTER — OFFICE VISIT (OUTPATIENT)
Dept: URGENT CARE | Facility: CLINIC | Age: 40
End: 2025-01-24
Payer: COMMERCIAL

## 2025-01-24 VITALS
OXYGEN SATURATION: 97 % | HEART RATE: 110 BPM | WEIGHT: 230 LBS | BODY MASS INDEX: 31.19 KG/M2 | RESPIRATION RATE: 16 BRPM | DIASTOLIC BLOOD PRESSURE: 92 MMHG | TEMPERATURE: 98.7 F | SYSTOLIC BLOOD PRESSURE: 142 MMHG

## 2025-01-24 DIAGNOSIS — R68.89 FLU-LIKE SYMPTOMS: Primary | ICD-10-CM

## 2025-01-24 DIAGNOSIS — J02.9 SORE THROAT: ICD-10-CM

## 2025-01-24 LAB — S PYO AG THROAT QL: NEGATIVE

## 2025-01-24 PROCEDURE — 99283 EMERGENCY DEPT VISIT LOW MDM: CPT | Performed by: ORTHOPAEDIC SURGERY

## 2025-01-24 PROCEDURE — 87880 STREP A ASSAY W/OPTIC: CPT | Performed by: ORTHOPAEDIC SURGERY

## 2025-01-24 PROCEDURE — G0382 LEV 3 HOSP TYPE B ED VISIT: HCPCS | Performed by: ORTHOPAEDIC SURGERY

## 2025-01-24 RX ORDER — OSELTAMIVIR PHOSPHATE 75 MG/1
75 CAPSULE ORAL EVERY 12 HOURS SCHEDULED
Qty: 10 CAPSULE | Refills: 0 | Status: SHIPPED | OUTPATIENT
Start: 2025-01-24 | End: 2025-01-29

## 2025-01-24 NOTE — LETTER
January 24, 2025     Patient: Adan Carpenter   YOB: 1985   Date of Visit: 1/24/2025       To Whom It May Concern:    It is my medical opinion that Adan Carpenter may return to work on Monday, 1/27/2025 .    If you have any questions or concerns, please don't hesitate to call.         Sincerely,        Saira Rushing PA-C    CC: No Recipients

## 2025-01-24 NOTE — PROGRESS NOTES
[facility]        NAME: Adan Carpenter is a 39 y.o. male  : 1985    MRN: [id number]  DATE: 2025  TIME: 5:57 PM    Assessment and Plan   Flu-like symptoms [R68.89]  1. Flu-like symptoms  oseltamivir (TAMIFLU) 75 mg capsule        POCT strep negative.  Patient declined COVID/flu testing.     Patient Instructions       Most upper respiratory infections are viral and resolve on their own within 10-14 days. Antibiotics are not indicated for the viral infection, and are only prescribed if there is evidence for a bacterial infection. Viral infections are the most common, with bacterial infections only accounting for 0.5-2 percent of cases. Sometimes an upper respiratory infection may lead to secondary bacterial infection, such as bacterial sinusitis, in which case antibiotics would be indicated at that time. If your symptoms continue beyond 10-14 days or if you experience ongoing fevers, productive cough with green, brown, bloody phlegm production, you may have developed a bacterial infection. For the uncomplicated viral upper respiratory infection conservative management includes:    Fever and pain control:  Ibuprofen (Motrin) 600mg every 6 hours for fever, headaches, body aches   Ibuprofen is an NSAID. Please stop medication if you experience stomach/abdominal pain and report to your primary care provider.   Ask your primary care provider before you take NSAIDs if you are on any blood thinners, or if you have a history of heart disease, kidney disease, gastric bypass surgery, GI bleed, or poorly controlled high blood pressure.   May use acetaminophen (Tylenol) as directed on the bottle between doses of ibuprofen. Do not exceed 4,000mg of Tylenol a day.   Cough & Congestion:  Guaifenesin (Mucinex) as directed on the bottle for congestion and mucous-y cough.   Dextromethorphan (Delsym, Robitussin) for dry cough and cough suppression   Pseudoephedrine (Sudafed) for congestion and sinus pressure    Sudafed may cause increased heart rate, irregular heart rate, and an increase in blood pressure. Please do not take Sudafed if you have a history of heart disease or high blood pressure.   Sudafed should not be taken if you are on anti-depressants such as those belonging to the class MAOIs or tricyclics.  Coricidin HBP (chlorpheniramine maleate) can be used as a decongestant in place of other options for those unable to take Sudafed.   Combination cough and cold such as Dimetapp and Mucinex DM also available  Sudafed PE Head Congestion +Flu Severe contains a combination of Sudafed, Tylenol, Mucinex, and Delsym  If prescribed, take Tessalon Pearles or Bromfed/Phenergan DM as directed  Avoid taking prescription cough/congestion medication and OTC options at the same time  Sore Throat:  Cepacol lozenges  Chloraseptic spray  Throat Coat tea  Warm salt water gargles   Vitamin/Minerals:  Vitamin D3 2,000 IU daily  Vitamin C 1000mg twice a day  Some studies suggest that Zinc 12.5-15mg every 2 hours while awake for 5 days may shorten symptom duration by 1-2 days  Other:   Plenty of fluids and rest  Cool mist humidifiers  Nasal sinus rinses such as NettiPot, Neimed, or Navage can be used to help flush out sinuses  Please only use distilled/sterile water that can be purchased at your local pharmacy  Nasal spray options:  Nasal steroid sprays such as Flonase, Nasonex, Nasacort may help with sinus congestion, itchy/watery eyes, clogged ears  These options must be used consistently for at least 2 weeks for full effect  Afrin nasal spray for quick acting congestion relief  Saline nasal spray for dry nose, irritation of the nasal passages  Follow up with PCP in 3-5 days  Proceed to the ED if symptoms worsen      If tests are performed, our office will contact you with results only if changes need to made to the care plan discussed with you at the visit. You can review your full results on [facility] Mychart.    Chief Complaint      Chief Complaint   Patient presents with    Earache     B/L earache, congestion , raspy voice  cough , started a month ago  daughter has the Flu         History of Present Illness       39-year-old male presents to the urgent care for evaluation of sinus pressure, congestion, body aches, sweats and chills.  Symptoms started yesterday, though he reports that for the past month he has been experiencing congestion and cold-like symptoms.  He notes overall the symptoms were getting better up until yesterday when symptoms worsened.  He notes his daughter is currently home sick.  He denies any recorded fevers.  He denies any shortness of breath, but notes that it feels like his breathing is shallow.  He is a heavy smoker, denies any history of asthma.  He denies any nausea, vomiting, diarrhea.  For symptom relief he has been taking Advil, Tylenol PM, Flonase, Robitussin.        Review of Systems   Review of Systems   Constitutional:  Positive for chills, diaphoresis and fatigue. Negative for fever.   HENT:  Positive for congestion, rhinorrhea, sinus pressure and sore throat. Negative for ear pain.    Eyes:  Negative for pain and visual disturbance.   Respiratory:  Positive for cough. Negative for shortness of breath.    Cardiovascular:  Negative for chest pain and palpitations.   Gastrointestinal:  Negative for abdominal pain, diarrhea, nausea and vomiting.   Genitourinary:  Negative for dysuria and hematuria.   Musculoskeletal:  Positive for myalgias. Negative for arthralgias and back pain.   Skin:  Negative for color change and rash.   Neurological:  Positive for headaches. Negative for dizziness, seizures and syncope.   All other systems reviewed and are negative.        Current Medications       Current Outpatient Medications:     ibuprofen (MOTRIN) 200 mg tablet, Take by mouth, Disp: , Rfl:     losartan (COZAAR) 50 mg tablet, Take 1 tablet (50 mg total) by mouth daily, Disp: 90 tablet, Rfl: 0    oseltamivir  (TAMIFLU) 75 mg capsule, Take 1 capsule (75 mg total) by mouth every 12 (twelve) hours for 5 days, Disp: 10 capsule, Rfl: 0    pantoprazole (PROTONIX) 20 mg tablet, Take 1 tablet (20 mg total) by mouth daily, Disp: 100 tablet, Rfl: 1    albuterol (ProAir HFA) 90 mcg/act inhaler, Inhale 2 puffs every 6 (six) hours as needed for wheezing (Patient not taking: Reported on 5/20/2024), Disp: 8.5 g, Rfl: 0    Current Allergies     Allergies as of 01/24/2025    (No Known Allergies)            The following portions of the patient's history were reviewed and updated as appropriate: allergies, current medications, past family history, past medical history, past social history, past surgical history and problem list.     Past Medical History:   Diagnosis Date    Hypertension        History reviewed. No pertinent surgical history.    History reviewed. No pertinent family history.      Medications have been verified.        Objective   /97   Pulse (!) 110   Temp 98.7 °F (37.1 °C)   Resp 16   Wt 104 kg (230 lb)   SpO2 97%   BMI 31.19 kg/m²        Physical Exam     Physical Exam  Vitals and nursing note reviewed.   Constitutional:       General: He is not in acute distress.     Appearance: Normal appearance. He is not ill-appearing.   HENT:      Head: Normocephalic and atraumatic.      Right Ear: Tympanic membrane normal.      Left Ear: Tympanic membrane normal.      Nose: Nose normal.      Mouth/Throat:      Mouth: Mucous membranes are moist.      Pharynx: Oropharynx is clear. Posterior oropharyngeal erythema (petechiae) present. No oropharyngeal exudate.   Eyes:      Extraocular Movements: Extraocular movements intact.      Pupils: Pupils are equal, round, and reactive to light.   Cardiovascular:      Rate and Rhythm: Normal rate and regular rhythm.      Pulses: Normal pulses.      Heart sounds: Normal heart sounds. No murmur heard.  Pulmonary:      Effort: Pulmonary effort is normal. No respiratory distress.       Breath sounds: Normal breath sounds. No wheezing or rhonchi.   Abdominal:      Palpations: Abdomen is soft.      Tenderness: There is no abdominal tenderness.   Musculoskeletal:         General: Normal range of motion.      Cervical back: Normal range of motion.   Lymphadenopathy:      Cervical: No cervical adenopathy.   Skin:     General: Skin is warm and dry.      Capillary Refill: Capillary refill takes less than 2 seconds.   Neurological:      General: No focal deficit present.      Mental Status: He is alert and oriented to person, place, and time.   Psychiatric:         Mood and Affect: Mood normal.         Behavior: Behavior normal.

## 2025-01-27 ENCOUNTER — DOCUMENTATION (OUTPATIENT)
Dept: ADMINISTRATIVE | Facility: OTHER | Age: 40
End: 2025-01-27

## 2025-01-27 NOTE — PROGRESS NOTES
Saira Rushing PA-C  P Patient Reported Team         Blood pressure elevated  Appointment department: [facility]  Appointment provider: Saira Rushing PA-C  Blood pressure  01/24/25 1841 142/92  01/24/25 1703 143/97    01/27/25 11:47 AM    Patient was called after the Urgent Care visit Patient declined to schedule appointment.    Thank you.  Juanis Aburto  [facility]

## 2025-02-17 DIAGNOSIS — I10 PRIMARY HYPERTENSION: ICD-10-CM

## 2025-02-18 DIAGNOSIS — I10 PRIMARY HYPERTENSION: Primary | ICD-10-CM

## 2025-02-18 RX ORDER — LOSARTAN POTASSIUM 50 MG/1
50 TABLET ORAL DAILY
Qty: 90 TABLET | Refills: 0 | Status: SHIPPED | OUTPATIENT
Start: 2025-02-18

## 2025-03-07 ENCOUNTER — APPOINTMENT (OUTPATIENT)
Dept: LAB | Facility: CLINIC | Age: 40
End: 2025-03-07
Payer: COMMERCIAL

## 2025-03-07 DIAGNOSIS — I10 PRIMARY HYPERTENSION: ICD-10-CM

## 2025-03-07 LAB
ALBUMIN SERPL BCG-MCNC: 4.3 G/DL (ref 3.5–5)
ALP SERPL-CCNC: 98 U/L (ref 34–104)
ALT SERPL W P-5'-P-CCNC: 189 U/L (ref 7–52)
ANION GAP SERPL CALCULATED.3IONS-SCNC: 11 MMOL/L (ref 4–13)
AST SERPL W P-5'-P-CCNC: 207 U/L (ref 13–39)
BILIRUB SERPL-MCNC: 0.95 MG/DL (ref 0.2–1)
BUN SERPL-MCNC: 10 MG/DL (ref 5–25)
CALCIUM SERPL-MCNC: 9.3 MG/DL (ref 8.4–10.2)
CHLORIDE SERPL-SCNC: 100 MMOL/L (ref 96–108)
CHOLEST SERPL-MCNC: 223 MG/DL (ref ?–200)
CO2 SERPL-SCNC: 26 MMOL/L (ref 21–32)
CREAT SERPL-MCNC: 0.7 MG/DL (ref 0.6–1.3)
GFR SERPL CREATININE-BSD FRML MDRD: 119 ML/MIN/1.73SQ M
GLUCOSE P FAST SERPL-MCNC: 152 MG/DL (ref 65–99)
HDLC SERPL-MCNC: 34 MG/DL
LDLC SERPL CALC-MCNC: 158 MG/DL (ref 0–100)
NONHDLC SERPL-MCNC: 189 MG/DL
POTASSIUM SERPL-SCNC: 3.9 MMOL/L (ref 3.5–5.3)
PROT SERPL-MCNC: 8.6 G/DL (ref 6.4–8.4)
SODIUM SERPL-SCNC: 137 MMOL/L (ref 135–147)
TRIGL SERPL-MCNC: 155 MG/DL (ref ?–150)

## 2025-03-07 PROCEDURE — 80053 COMPREHEN METABOLIC PANEL: CPT

## 2025-03-07 PROCEDURE — 80061 LIPID PANEL: CPT

## 2025-03-07 PROCEDURE — 36415 COLL VENOUS BLD VENIPUNCTURE: CPT

## 2025-03-12 ENCOUNTER — RESULTS FOLLOW-UP (OUTPATIENT)
Dept: FAMILY MEDICINE CLINIC | Facility: CLINIC | Age: 40
End: 2025-03-12

## 2025-03-13 ENCOUNTER — OFFICE VISIT (OUTPATIENT)
Dept: FAMILY MEDICINE CLINIC | Facility: CLINIC | Age: 40
End: 2025-03-13
Payer: COMMERCIAL

## 2025-03-13 ENCOUNTER — APPOINTMENT (OUTPATIENT)
Dept: LAB | Facility: CLINIC | Age: 40
End: 2025-03-13
Payer: COMMERCIAL

## 2025-03-13 VITALS
SYSTOLIC BLOOD PRESSURE: 142 MMHG | DIASTOLIC BLOOD PRESSURE: 94 MMHG | TEMPERATURE: 96.6 F | BODY MASS INDEX: 31.69 KG/M2 | OXYGEN SATURATION: 99 % | WEIGHT: 234 LBS | HEIGHT: 72 IN | RESPIRATION RATE: 18 BRPM | HEART RATE: 110 BPM

## 2025-03-13 DIAGNOSIS — I10 PRIMARY HYPERTENSION: ICD-10-CM

## 2025-03-13 DIAGNOSIS — K76.0 HEPATIC STEATOSIS: ICD-10-CM

## 2025-03-13 DIAGNOSIS — E78.2 MIXED HYPERLIPIDEMIA: ICD-10-CM

## 2025-03-13 DIAGNOSIS — K76.0 HEPATIC STEATOSIS: Primary | ICD-10-CM

## 2025-03-13 LAB
EST. AVERAGE GLUCOSE BLD GHB EST-MCNC: 166 MG/DL
HBA1C MFR BLD: 7.4 %

## 2025-03-13 PROCEDURE — 99214 OFFICE O/P EST MOD 30 MIN: CPT | Performed by: FAMILY MEDICINE

## 2025-03-13 PROCEDURE — 83036 HEMOGLOBIN GLYCOSYLATED A1C: CPT

## 2025-03-13 PROCEDURE — 36415 COLL VENOUS BLD VENIPUNCTURE: CPT

## 2025-03-14 ENCOUNTER — RESULTS FOLLOW-UP (OUTPATIENT)
Dept: FAMILY MEDICINE CLINIC | Facility: CLINIC | Age: 40
End: 2025-03-14

## 2025-03-21 NOTE — ASSESSMENT & PLAN NOTE
Ultrasound ordered today.  Referral to hepatology also placed.  Will get A1c for further evaluation.  Patient admits to almost daily beer consumption.  This ranges from 6 beers to 20.  He says some days he does not drink.  I have recommended he slowly wean off beer.  Offered medication assisted therapy however patient declined.  I also recommended dietary modification.  Orders:    US elastography/UGAP; Future    Ambulatory Referral to Hepatology; Future    Hemoglobin A1C; Future

## 2025-03-21 NOTE — PROGRESS NOTES
Name: Adan Carpenter      : 1985      MRN: [id number]  Encounter Provider: Joseph Coto MD  Encounter Date: 3/13/2025   Encounter department: [facility]  :  Assessment & Plan  Hepatic steatosis         Ultrasound ordered today.  Referral to hepatology also placed.  Will get A1c for further evaluation.  Patient admits to almost daily beer consumption.  This ranges from 6 beers to 20.  He says some days he does not drink.  I have recommended he slowly wean off beer.  Offered medication assisted therapy however patient declined.  I also recommended dietary modification.  Orders:    US elastography/UGAP; Future    Ambulatory Referral to Hepatology; Future    Hemoglobin A1C; Future    Primary hypertension  Uncontrolled.  Will continue current meds and see if Bps improve with diet and lifestyle modification.        Mixed hyperlipidemia  Elevations in lipid panel with triglycerides, total cholesterol and LDL.  Due to his elevated liver enzymes cannot start statin.  I recommend dietary lifestyle modification.  We will continue to monitor.                History of Present Illness   Patient comes office today for chronic follow-up and follow-up on recent labs.  Liver enzymes are high.  They have been high for many years now.  He admits to beer drinking almost daily.  Sometimes can drink 6 beers a day and sometimes up to 20.  He does not have a good diet either.  He also has history of hypertension as well as obstructive sleep apnea.  He is on CPAP for sleep apnea.      Review of Systems   All other systems reviewed and are negative.      Objective   /94 (BP Location: Left arm, Patient Position: Sitting, Cuff Size: Large)   Pulse (!) 110   Temp (!) 96.6 °F (35.9 °C) (Tympanic)   Resp 18   Ht 6' (1.829 m)   Wt 106 kg (234 lb)   SpO2 99%   BMI 31.74 kg/m²      Physical Exam  Vitals and nursing note reviewed.   Constitutional:       General: He is not in acute  distress.     Appearance: Normal appearance. He is well-developed. He is not ill-appearing, toxic-appearing or diaphoretic.   HENT:      Head: Normocephalic and atraumatic.   Eyes:      General:         Right eye: No discharge.         Left eye: No discharge.      Extraocular Movements: Extraocular movements intact.      Conjunctiva/sclera: Conjunctivae normal.   Cardiovascular:      Rate and Rhythm: Normal rate.      Pulses:           Dorsalis pedis pulses are 2+ on the right side and 2+ on the left side.        Posterior tibial pulses are 2+ on the right side and 2+ on the left side.   Pulmonary:      Effort: Pulmonary effort is normal.   Musculoskeletal:      Cervical back: Normal range of motion and neck supple.   Feet:      Right foot:      Skin integrity: No ulcer, skin breakdown, erythema, warmth, callus or dry skin.      Left foot:      Skin integrity: No ulcer, skin breakdown, erythema, warmth, callus or dry skin.   Skin:     General: Skin is dry.      Capillary Refill: Capillary refill takes less than 2 seconds.   Neurological:      Mental Status: He is alert and oriented to person, place, and time.   Psychiatric:         Mood and Affect: Mood normal.         Behavior: Behavior normal.         Thought Content: Thought content normal.         Judgment: Judgment normal.

## 2025-03-23 NOTE — ASSESSMENT & PLAN NOTE
Uncontrolled.  Will continue current meds and see if Bps improve with diet and lifestyle modification.

## 2025-03-23 NOTE — ASSESSMENT & PLAN NOTE
Elevations in lipid panel with triglycerides, total cholesterol and LDL.  Due to his elevated liver enzymes cannot start statin.  I recommend dietary lifestyle modification.  We will continue to monitor.

## 2025-03-28 DIAGNOSIS — E11.9 TYPE 2 DIABETES MELLITUS WITHOUT COMPLICATION, WITHOUT LONG-TERM CURRENT USE OF INSULIN (HCC): Primary | ICD-10-CM

## 2025-03-28 NOTE — TELEPHONE ENCOUNTER
Patient stated he just missed a call. Advised Yessenia left a detailed message. Dr Coto stated he has a few minutes around 2:00 PM today. Patient stated he will have his phone on him and will wait for phone call.

## 2025-03-28 NOTE — TELEPHONE ENCOUNTER
Pt called reporting that he seen Dr. Coto on 3/13/25 and then had labs completed.    He received a call from Dr. Casiano on 3/14 stating that his A1C was high (7.4) and that pt now has diabetes but pt was unable to discuss with Dr. Coto at the time and was told that he would receive a call back within the next week to discuss.     Pt reporting that he never received a call-back.  Pt needs to know what his next steps would be.    Please advise - [phone]

## 2025-03-28 NOTE — PROGRESS NOTES
Spoke to patient about recent A1c results.  Will start metformin.  Side effects discussed.  Prescription sent to pharmacy.  He was in the car and could not schedule appointment.  He will call back on Monday and schedule 3-month follow-up appointment.  We will recheck A1c here in the office.

## 2025-04-22 ENCOUNTER — OFFICE VISIT (OUTPATIENT)
Dept: GASTROENTEROLOGY | Facility: CLINIC | Age: 40
End: 2025-04-22
Payer: COMMERCIAL

## 2025-04-22 VITALS
SYSTOLIC BLOOD PRESSURE: 152 MMHG | RESPIRATION RATE: 17 BRPM | OXYGEN SATURATION: 96 % | DIASTOLIC BLOOD PRESSURE: 90 MMHG | HEART RATE: 90 BPM | WEIGHT: 231 LBS | BODY MASS INDEX: 31.29 KG/M2 | TEMPERATURE: 97.7 F | HEIGHT: 72 IN

## 2025-04-22 DIAGNOSIS — K76.0 HEPATIC STEATOSIS: ICD-10-CM

## 2025-04-22 DIAGNOSIS — R74.8 ELEVATED LIVER ENZYMES: Primary | ICD-10-CM

## 2025-04-22 DIAGNOSIS — Z72.0 TOBACCO USE: ICD-10-CM

## 2025-04-22 DIAGNOSIS — Z79.1 NSAID LONG-TERM USE: ICD-10-CM

## 2025-04-22 DIAGNOSIS — F10.90 ALCOHOL USE: ICD-10-CM

## 2025-04-22 PROCEDURE — 99204 OFFICE O/P NEW MOD 45 MIN: CPT | Performed by: FAMILY MEDICINE

## 2025-04-22 RX ORDER — SODIUM CHLORIDE, SODIUM LACTATE, POTASSIUM CHLORIDE, CALCIUM CHLORIDE 600; 310; 30; 20 MG/100ML; MG/100ML; MG/100ML; MG/100ML
125 INJECTION, SOLUTION INTRAVENOUS CONTINUOUS
OUTPATIENT
Start: 2025-04-22

## 2025-04-22 NOTE — ASSESSMENT & PLAN NOTE
Refer to A/P above.   Orders:  •  Ambulatory Referral to Hepatology  •  CBC and differential; Future  •  Comprehensive metabolic panel; Future  •  Protime-INR; Future  •  Alpha 1 Antitrypsin Phenotype; Future  •  МАРИНА Screen w/Reflex Cascade; Future  •  Antimitochondrial antibody; Future  •  Anti-smooth muscle antibody, IgG; Future  •  IgG, IgA, IgM; Future  •  Chronic Hepatitis Panel; Future  •  Ceruloplasmin; Future  •  Iron Panel (Includes Ferritin, Iron Sat%, Iron, and TIBC); Future  •  Hepatitis A antibody, total; Future  •  Hepatitis B surface antibody; Future

## 2025-04-22 NOTE — PROGRESS NOTES
Name: Adan Carpenter      : 1985      MRN: [id number]  Encounter Provider: Leah Salvador PA-C  Encounter Date: 2025   [facility]  :  Assessment & Plan  Elevated liver enzymes  Patient with significantly elevated serum transaminases since 2021.  Prior serologic evaluation included a positive МАРИНА, negative AMA, negative ASMA and a negative acute hepatitis panel.  He also had a complete abdominal US (2022) notable for hepatic steatosis but otherwise unremarkable.  No clinical, serologic or radiographic evidence of chronic liver disease.    Suspect patient's abnormal liver chemistries may be secondary to ALD given his reports of chronic excessive EtOH use. However, we will pursue a complete serologic evaluation to assess for competing causes of liver disease. Will hold off on a US elastography in the setting of significantly abnormal liver chemistries and active EtOH use as do not overestimate his LSM.     Furthermore, he may require a liver biopsy if his workup is negative and liver chemistries remain elevated despite sobriety which would also allow for staging of his fibrosis.    Discussed the basic pathophysiology of ALD, potential to progress to cirrhosis if left untreated and recommendations for treatment including a slow taper of his EtOH use over the next 4 weeks to achieve complete sobriety. He agrees and feels as though he can accomplish this. Also recommend working towards steady and sustainable weight loss as well as optimization of his metabolic risk factors. Will check his LFTs now and likely following 4 weeks of sobriety to determine if they are downtrending. Further recommendations pending the completion of his workup.     Orders:  •  CBC and differential; Future  •  Comprehensive metabolic panel; Future  •  Protime-INR; Future  •  Alpha 1 Antitrypsin Phenotype; Future  •  МАРИНА Screen w/Reflex Cascade; Future  •   Antimitochondrial antibody; Future  •  Anti-smooth muscle antibody, IgG; Future  •  IgG, IgA, IgM; Future  •  Chronic Hepatitis Panel; Future  •  Ceruloplasmin; Future  •  Iron Panel (Includes Ferritin, Iron Sat%, Iron, and TIBC); Future  •  Hepatitis A antibody, total; Future  •  Hepatitis B surface antibody; Future    Hepatic steatosis  Refer to A/P above.   Orders:  •  Ambulatory Referral to Hepatology  •  CBC and differential; Future  •  Comprehensive metabolic panel; Future  •  Protime-INR; Future  •  Alpha 1 Antitrypsin Phenotype; Future  •  МАРИНА Screen w/Reflex Cascade; Future  •  Antimitochondrial antibody; Future  •  Anti-smooth muscle antibody, IgG; Future  •  IgG, IgA, IgM; Future  •  Chronic Hepatitis Panel; Future  •  Ceruloplasmin; Future  •  Iron Panel (Includes Ferritin, Iron Sat%, Iron, and TIBC); Future  •  Hepatitis A antibody, total; Future  •  Hepatitis B surface antibody; Future    NSAID long-term use  Patient with a longstanding history of GERD in the setting of chronic and excessive NSAID use, alcohol use and tobacco use.  He is currently taking pantoprazole 20 mg once daily with good effect but cannot miss a single dose without severe breakthrough reflux. No additional alarm features. No prior endoscopic evaluation. No FH of esophageal or gastric cancer.    Plan for an EGD primarily to screen for Peck's esophagus.  Continue pantoprazole 20 mg once daily.  Discussed optimization of PPI dietary/lifestyle modifications to help prevent reflux.  He is working towards complete alcohol cessation due to elevated liver enzymes likely secondary to ALD (refer to A/P above).  Also recommended that he avoid any/all NSAID use and counseled on the importance of tobacco cessation. He previously had success with nicotine patches and encouraged he discuss this with his PCP.    Orders:  •  EGD; Future    Alcohol use  Refer to A/P above.   Orders:  •  EGD; Future    Tobacco use  Refer to A/P  above.  Orders:  •  EGD; Future    Follow-up in 3 months or sooner if necessary.      History of Present Illness   HPI    Adan Carpenter is a 39 y.o. male with PMH significant for obesity, HTN, HLD, TORI, GERD, tobacco use and vitamin D deficiency who presents today for consultation regarding hepatic steatosis.    Adan has been noted to have significantly elevated serum transaminases since September 2021 with an  and . His alkaline phosphatase and T. bili have historically been within normal limits. He had autoimmune serologies drawn shortly thereafter including a positive МАРИНА but negative AMA and ASMA. Also with a negative acute hepatitis panel. This prompted a complete abdominal ultrasound in April 2022 notable for hepatic steatosis but was otherwise unremarkable. Normal platelet count.    Denies a personal history of chronic liver disease. Denies a family history of liver disease or liver-related cancers. Denies any known past or current infection with viral hepatitis. Denies being treated for any autoimmune conditions. Denies taking any herbal supplements, performance-enhancing drugs, or OTC/prescription medications not reflected on their med list. Denies excessive Tylenol use.    He will drink on average 4-6 light beers during the week and 12-15 light beers at least 1 night per week but did drink heavier in earlier years. He's also been taking up to 1000 mg of ibuprofen daily for at least 8 years for joint pain. He is currently taking pantoprazole 20 mg once daily. He cannot miss a dose or has severe breakthrough symptoms. He also smokes tobacco.     Denies a FH of colon, esophageal or gastric cancers.        History obtained from: patient    Review of Systems   All other systems reviewed and are negative.    Pertinent Medical History     Medical History Reviewed by provider this encounter:  Tobacco  Allergies  Meds  Problems  Med Hx  Surg Hx  Fam Hx     .  Past Medical History   Past Medical  History:   Diagnosis Date   • GERD (gastroesophageal reflux disease) 01/01/2023   • Hypertension 06/01/2023   • Visual impairment 01/01/1992     History reviewed. No pertinent surgical history.  Family History   Problem Relation Age of Onset   • Alcohol abuse Father    • Heart disease Father    • Diabetes Father    • Arthritis Father    • Alcohol abuse Sister       reports that he has been smoking cigarettes. He has never used smokeless tobacco. He reports current alcohol use. He reports that he does not use drugs.  Current Outpatient Medications   Medication Instructions   • albuterol (ProAir HFA) 90 mcg/act inhaler 2 puffs, Inhalation, Every 6 hours PRN   • ibuprofen (MOTRIN) 200 mg, Every 6 hours PRN   • losartan (COZAAR) 50 mg, Oral, Daily   • metFORMIN (GLUCOPHAGE) 500 mg, Oral, 2 times daily with meals   • pantoprazole (PROTONIX) 20 mg, Oral, Daily   No Known Allergies   Current Outpatient Medications on File Prior to Visit   Medication Sig Dispense Refill   • ibuprofen (MOTRIN) 200 mg tablet Take 200 mg by mouth every 6 (six) hours as needed for moderate pain     • losartan (COZAAR) 50 mg tablet take 1 tablet by mouth once daily 90 tablet 0   • metFORMIN (GLUCOPHAGE) 500 mg tablet Take 1 tablet (500 mg total) by mouth 2 (two) times a day with meals 180 tablet 0   • pantoprazole (PROTONIX) 20 mg tablet Take 1 tablet (20 mg total) by mouth daily 100 tablet 1   • albuterol (ProAir HFA) 90 mcg/act inhaler Inhale 2 puffs every 6 (six) hours as needed for wheezing (Patient not taking: Reported on 4/22/2025) 8.5 g 0     No current facility-administered medications on file prior to visit.      Social History     Tobacco Use   • Smoking status: Every Day     Types: Cigarettes   • Smokeless tobacco: Never   Vaping Use   • Vaping status: Some Days   • Substances: Nicotine   Substance and Sexual Activity   • Alcohol use: Yes     Comment: socially   • Drug use: Never   • Sexual activity: Not on file        Objective   BP  152/90 (BP Location: Right arm, Patient Position: Sitting, Cuff Size: Adult)   Pulse 90   Temp 97.7 °F (36.5 °C) (Temporal)   Resp 17   Ht 6' (1.829 m)   Wt 105 kg (231 lb)   SpO2 96%   BMI 31.33 kg/m²      Physical Exam  Vitals and nursing note reviewed.   Constitutional:       General: He is not in acute distress.     Appearance: Normal appearance. He is well-developed. He is not ill-appearing or toxic-appearing.   HENT:      Head: Normocephalic and atraumatic.   Eyes:      General: No scleral icterus.     Conjunctiva/sclera: Conjunctivae normal.   Cardiovascular:      Heart sounds: Normal heart sounds.   Pulmonary:      Effort: Pulmonary effort is normal. No respiratory distress.      Breath sounds: Normal breath sounds.   Abdominal:      General: Bowel sounds are normal. There is no distension.      Palpations: Abdomen is soft. There is no mass.      Tenderness: There is no abdominal tenderness.   Musculoskeletal:      Right lower leg: No edema.      Left lower leg: No edema.   Skin:     General: Skin is warm and dry.      Coloration: Skin is not jaundiced.      Findings: No bruising or rash.   Neurological:      Mental Status: He is alert and oriented to person, place, and time. Mental status is at baseline.   Psychiatric:         Mood and Affect: Mood normal.         Behavior: Behavior normal.

## 2025-04-26 DIAGNOSIS — I10 PRIMARY HYPERTENSION: ICD-10-CM

## 2025-04-26 DIAGNOSIS — Z76.0 MEDICINE REFILL: ICD-10-CM

## 2025-04-26 RX ORDER — LOSARTAN POTASSIUM 50 MG/1
50 TABLET ORAL DAILY
Qty: 90 TABLET | Refills: 1 | Status: SHIPPED | OUTPATIENT
Start: 2025-04-26

## 2025-04-26 RX ORDER — PANTOPRAZOLE SODIUM 20 MG/1
20 TABLET, DELAYED RELEASE ORAL DAILY
Qty: 90 TABLET | Refills: 1 | Status: SHIPPED | OUTPATIENT
Start: 2025-04-26

## 2025-06-01 ENCOUNTER — OFFICE VISIT (OUTPATIENT)
Dept: URGENT CARE | Facility: CLINIC | Age: 40
End: 2025-06-01
Payer: COMMERCIAL

## 2025-06-01 VITALS
OXYGEN SATURATION: 98 % | RESPIRATION RATE: 18 BRPM | SYSTOLIC BLOOD PRESSURE: 156 MMHG | BODY MASS INDEX: 31.75 KG/M2 | WEIGHT: 234.1 LBS | DIASTOLIC BLOOD PRESSURE: 87 MMHG | TEMPERATURE: 101.8 F | HEART RATE: 104 BPM

## 2025-06-01 DIAGNOSIS — J02.0 STREP THROAT: Primary | ICD-10-CM

## 2025-06-01 DIAGNOSIS — H92.03 OTALGIA OF BOTH EARS: ICD-10-CM

## 2025-06-01 DIAGNOSIS — R68.89 FLU-LIKE SYMPTOMS: ICD-10-CM

## 2025-06-01 DIAGNOSIS — J34.89 SINUS PRESSURE: ICD-10-CM

## 2025-06-01 LAB — S PYO AG THROAT QL: POSITIVE

## 2025-06-01 PROCEDURE — G0383 LEV 4 HOSP TYPE B ED VISIT: HCPCS | Performed by: NURSE PRACTITIONER

## 2025-06-01 PROCEDURE — 87880 STREP A ASSAY W/OPTIC: CPT | Performed by: NURSE PRACTITIONER

## 2025-06-01 RX ORDER — PREDNISONE 20 MG/1
40 TABLET ORAL DAILY
Qty: 10 TABLET | Refills: 0 | Status: SHIPPED | OUTPATIENT
Start: 2025-06-01 | End: 2025-06-06

## 2025-06-01 RX ORDER — AMOXICILLIN 875 MG/1
875 TABLET, COATED ORAL 2 TIMES DAILY
Qty: 20 TABLET | Refills: 0 | Status: SHIPPED | OUTPATIENT
Start: 2025-06-01 | End: 2025-06-11

## 2025-06-01 NOTE — LETTER
June 1, 2025     Patient: Adan Carpenter   YOB: 1985   Date of Visit: 6/1/2025       To Whom It May Concern:    It is my medical opinion that Adan Carpenter should remain out of work until symptoms improve for 24 hours with return most likely between Tues and Thurs.  Please excuse for time missed due to acute illness.    If you have any questions or concerns, please don't hesitate to call.         Sincerely,        ALEKSANDAR Ramirez    CC: No Recipients

## 2025-06-01 NOTE — PROGRESS NOTES
[facility]        NAME: Adan Carpenter is a 39 y.o. male  : 1985    MRN: [id number]  DATE: 2025  TIME: 10:25 AM      Assessment and Plan     Strep throat [J02.0]  1. Strep throat  POCT rapid strepA    amoxicillin (AMOXIL) 875 mg tablet    predniSONE 20 mg tablet      2. Sinus pressure  amoxicillin (AMOXIL) 875 mg tablet    predniSONE 20 mg tablet      3. Otalgia of both ears  amoxicillin (AMOXIL) 875 mg tablet    predniSONE 20 mg tablet      4. Flu-like symptoms  amoxicillin (AMOXIL) 875 mg tablet    predniSONE 20 mg tablet        Discussed with patient that I am treating with regular strength amoxicillin rather than the lower dose that mainly covers strep throat since he had other sinus symptoms a full 3 days before the throat symptoms started.  The symptoms could all be from the strep throat and he could have simply had an atypical onset, but if he is already on antibiotic anyway I would rather cover the other symptoms he is having since they did start before the sore throat.  Patient is agreeable to plan    Patient Instructions   There are no Patient Instructions on file for this visit.    Follow up with PCP in 3-5 days.  Proceed to  ER if symptoms worsen.    Chief Complaint     Chief Complaint   Patient presents with    Sore Throat     Sore throat, fever, congestion, started Wednesday taking , Advil, tylenol and Robitussin         History of Present Illness     Patient reports onset of sinus symptoms illness on Wednesday.  He he stayed home from work Thursday and Friday utilizing personal days.  He notes fever, congestion, fatigue, body aches, etc.  Lots of head pressure and bilateral ear pain/pressure.  He states he just had onset of significant sore throat yesterday morning which is quickly gotten worse not better.  He has been taking Advil, Tylenol and Robitussin with only slight improvement in symptoms from these.  No specific sick contacts.  He notes he worked yesterday for 4 hours  and it was a very difficult shift due to how poorly he felt.        Review of Systems     Review of Systems   Constitutional:  Positive for fatigue and fever.   HENT:  Positive for congestion, ear pain, sinus pressure and sinus pain. Negative for ear discharge.    Respiratory:  Positive for cough.    Musculoskeletal:  Positive for arthralgias and myalgias.   All other systems reviewed and are negative.        Current Medications     Current Medications[1]    Current Allergies     Allergies as of 06/01/2025    (No Known Allergies)              The following portions of the patient's history were reviewed and updated as appropriate: allergies, current medications, past family history, past medical history, past social history, past surgical history and problem list.     Past Medical History[2]    Past Surgical History[3]    Family History[4]      Medications have been verified.        Objective     /87   Pulse 104   Temp (!) 101.8 °F (38.8 °C)   Resp 18   Wt 106 kg (234 lb 1.6 oz)   SpO2 98%   BMI 31.75 kg/m²   No LMP for male patient.         Physical Exam     Physical Exam  Vitals and nursing note reviewed.   Constitutional:       General: He is not in acute distress.     Appearance: Normal appearance. He is well-developed. He is ill-appearing. He is not toxic-appearing or diaphoretic.   HENT:      Head: Normocephalic and atraumatic.      Right Ear: Hearing, tympanic membrane, ear canal and external ear normal. Tenderness present. No drainage or swelling. No middle ear effusion. Tympanic membrane is not erythematous or bulging.      Left Ear: Hearing, tympanic membrane, ear canal and external ear normal. Tenderness present. No drainage or swelling.  No middle ear effusion. Tympanic membrane is not erythematous or bulging.      Nose: Mucosal edema and congestion present.      Right Sinus: Maxillary sinus tenderness and frontal sinus tenderness present.      Left Sinus: Maxillary sinus tenderness and  frontal sinus tenderness present.      Mouth/Throat:      Mouth: Mucous membranes are moist.      Pharynx: Uvula midline. Oropharyngeal exudate and posterior oropharyngeal erythema present.      Tonsils: Tonsillar exudate present. No tonsillar abscesses. 3+ on the right. 3+ on the left.      Comments: Bilateral tonsils 3+ almost 4+    Eyes:      Pupils: Pupils are equal, round, and reactive to light.       Cardiovascular:      Rate and Rhythm: Normal rate and regular rhythm.      Heart sounds: Normal heart sounds. No murmur heard.     No friction rub. No gallop.   Pulmonary:      Effort: Pulmonary effort is normal. No tachypnea, bradypnea, accessory muscle usage or respiratory distress.      Breath sounds: Normal breath sounds. No stridor. No decreased breath sounds, wheezing, rhonchi or rales.   Chest:      Chest wall: No tenderness.   Abdominal:      General: Bowel sounds are normal. There is no distension.      Palpations: Abdomen is soft.      Tenderness: There is no abdominal tenderness.     Musculoskeletal:         General: Normal range of motion.      Cervical back: Normal range of motion and neck supple.   Lymphadenopathy:      Cervical: Cervical adenopathy present.     Skin:     General: Skin is warm and dry.      Capillary Refill: Capillary refill takes less than 2 seconds.     Neurological:      General: No focal deficit present.      Mental Status: He is alert and oriented to person, place, and time.     Psychiatric:         Mood and Affect: Mood normal.         Behavior: Behavior normal.         Thought Content: Thought content normal.         Judgment: Judgment normal.            [1]   Current Outpatient Medications:     amoxicillin (AMOXIL) 875 mg tablet, Take 1 tablet (875 mg total) by mouth 2 (two) times a day for 10 days, Disp: 20 tablet, Rfl: 0    ibuprofen (MOTRIN) 200 mg tablet, Take 200 mg by mouth every 6 (six) hours as needed for moderate pain, Disp: , Rfl:     losartan (COZAAR) 50 mg tablet,  Take 1 tablet (50 mg total) by mouth daily, Disp: 90 tablet, Rfl: 1    metFORMIN (GLUCOPHAGE) 500 mg tablet, Take 1 tablet (500 mg total) by mouth 2 (two) times a day with meals, Disp: 180 tablet, Rfl: 0    pantoprazole (PROTONIX) 20 mg tablet, Take 1 tablet (20 mg total) by mouth daily, Disp: 90 tablet, Rfl: 1    predniSONE 20 mg tablet, Take 2 tablets (40 mg total) by mouth daily for 5 days, Disp: 10 tablet, Rfl: 0    albuterol (ProAir HFA) 90 mcg/act inhaler, Inhale 2 puffs every 6 (six) hours as needed for wheezing (Patient not taking: Reported on 4/22/2025), Disp: 8.5 g, Rfl: 0  [2]   Past Medical History:  Diagnosis Date    GERD (gastroesophageal reflux disease) 01/01/2023    Hypertension 06/01/2023    Visual impairment 01/01/1992   [3] No past surgical history on file.  [4]   Family History  Problem Relation Name Age of Onset    Alcohol abuse Father Patrice Carpenter     Heart disease Father Patrice Carpenter     Diabetes Father Patrice Carpenter     Arthritis Father Patrice Carpenter     Alcohol abuse Sister Alicia Carpenter

## 2025-06-21 ENCOUNTER — OFFICE VISIT (OUTPATIENT)
Dept: URGENT CARE | Facility: CLINIC | Age: 40
End: 2025-06-21
Payer: COMMERCIAL

## 2025-06-21 VITALS
WEIGHT: 231.1 LBS | OXYGEN SATURATION: 98 % | DIASTOLIC BLOOD PRESSURE: 90 MMHG | RESPIRATION RATE: 16 BRPM | BODY MASS INDEX: 31.34 KG/M2 | SYSTOLIC BLOOD PRESSURE: 148 MMHG | TEMPERATURE: 98.1 F | HEART RATE: 100 BPM

## 2025-06-21 DIAGNOSIS — J03.00 ACUTE STREPTOCOCCAL TONSILLITIS, NOT SPECIFIED AS RECURRENT OR NOT: Primary | ICD-10-CM

## 2025-06-21 LAB — S PYO AG THROAT QL: POSITIVE

## 2025-06-21 PROCEDURE — 87880 STREP A ASSAY W/OPTIC: CPT | Performed by: NURSE PRACTITIONER

## 2025-06-21 PROCEDURE — G0382 LEV 3 HOSP TYPE B ED VISIT: HCPCS | Performed by: NURSE PRACTITIONER

## 2025-06-21 NOTE — PROGRESS NOTES
[facility]        NAME: Adan Carpenter is a 40 y.o. male  : 1985    MRN: [id number]  DATE: 2025  TIME: 1:46 PM      Assessment and Plan     Acute streptococcal tonsillitis, not specified as recurrent or not [J03.00]  1. Acute streptococcal tonsillitis, not specified as recurrent or not  POCT rapid ANTIGEN strepA    amoxicillin-clavulanate (AUGMENTIN) 875-125 mg per tablet            Patient Instructions   There are no Patient Instructions on file for this visit.    Follow up with PCP in 3-5 days.  Proceed to  ER if symptoms worsen.    Chief Complaint     Chief Complaint   Patient presents with    Earache     Left side earache, left side swollen gland, headache, chills, sweats , left side sore throat started yesterday, had strep throat 2 weeks ago.         History of Present Illness     Patient presents for onset of left-sided throat pain, ear pain, swollen gland with headache, chills and slight sweats that started yesterday.  He was seen here on  and diagnosed with strep.  This occurred after symptoms for about 3 days.  He was treated with amoxicillin 875 twice daily x 10 days due to co-occurring significant sinus symptoms.  He took this until completed.  He states he did change his toothbrush; does not use a reusable water bottle.  No one else in his house got sick then or is sick now.  He states he feels today similar to how he felt at the start of strep throat last time.        Review of Systems     Review of Systems   Constitutional:  Positive for chills.   HENT:  Positive for congestion, ear pain, sore throat and trouble swallowing (discomfort with swallowing but not difficulty with swallowing). Negative for ear discharge.    Respiratory: Negative.     Neurological:  Positive for headaches.   Hematological:  Positive for adenopathy.   All other systems reviewed and are negative.        Current Medications     Current Medications[1]    Current Allergies     Allergies as of  06/21/2025    (No Known Allergies)              The following portions of the patient's history were reviewed and updated as appropriate: allergies, current medications, past family history, past medical history, past social history, past surgical history and problem list.     Past Medical History[2]    Past Surgical History[3]    Family History[4]      Medications have been verified.        Objective     /90 Comment: manual  Pulse 100   Temp 98.1 °F (36.7 °C)   Resp 16   Wt 105 kg (231 lb 1.6 oz)   SpO2 98%   BMI 31.34 kg/m²   No LMP for male patient.         Physical Exam     Physical Exam  Vitals and nursing note reviewed.   Constitutional:       General: He is not in acute distress.     Appearance: Normal appearance. He is well-developed. He is ill-appearing (Mild). He is not toxic-appearing or diaphoretic.   HENT:      Head: Normocephalic and atraumatic.      Right Ear: Ear canal and external ear normal. No drainage, swelling or tenderness. A middle ear effusion (Slight) is present. Tympanic membrane is not erythematous.      Left Ear: Ear canal and external ear normal. Tenderness present. No drainage or swelling. A middle ear effusion (Slight) is present. Tympanic membrane is not erythematous.      Nose: Congestion (Mild) present.      Mouth/Throat:      Mouth: Mucous membranes are moist.      Pharynx: Uvula midline. Posterior oropharyngeal erythema present. No oropharyngeal exudate.      Tonsils: Tonsillar exudate present. No tonsillar abscesses. 3+ on the right. 3+ on the left.      Comments: While patient feels the pain more on the left, both tonsils are similar in size at 3+.  Left tonsil is not larger than right.  Exudate is present on both.  No abscess seen    Eyes:      Pupils: Pupils are equal, round, and reactive to light.     Pulmonary:      Effort: Pulmonary effort is normal. No respiratory distress.   Abdominal:      General: There is no distension.      Palpations: Abdomen is soft.      Musculoskeletal:         General: Normal range of motion.      Cervical back: Normal range of motion and neck supple.   Lymphadenopathy:      Cervical: Cervical adenopathy (Similar bilaterally) present.     Skin:     General: Skin is warm and dry.      Capillary Refill: Capillary refill takes less than 2 seconds.     Neurological:      General: No focal deficit present.      Mental Status: He is alert and oriented to person, place, and time.     Psychiatric:         Mood and Affect: Mood normal.         Behavior: Behavior normal.         Thought Content: Thought content normal.         Judgment: Judgment normal.            [1]   Current Outpatient Medications:     amoxicillin-clavulanate (AUGMENTIN) 875-125 mg per tablet, Take 1 tablet by mouth every 12 (twelve) hours for 10 days, Disp: 20 tablet, Rfl: 0    ibuprofen (MOTRIN) 200 mg tablet, Take 200 mg by mouth every 6 (six) hours as needed for moderate pain, Disp: , Rfl:     losartan (COZAAR) 50 mg tablet, Take 1 tablet (50 mg total) by mouth daily, Disp: 90 tablet, Rfl: 1    metFORMIN (GLUCOPHAGE) 500 mg tablet, Take 1 tablet (500 mg total) by mouth 2 (two) times a day with meals, Disp: 180 tablet, Rfl: 0    pantoprazole (PROTONIX) 20 mg tablet, Take 1 tablet (20 mg total) by mouth daily, Disp: 90 tablet, Rfl: 1    albuterol (ProAir HFA) 90 mcg/act inhaler, Inhale 2 puffs every 6 (six) hours as needed for wheezing (Patient not taking: Reported on 4/22/2025), Disp: 8.5 g, Rfl: 0  [2]   Past Medical History:  Diagnosis Date    GERD (gastroesophageal reflux disease) 01/01/2023    Hypertension 06/01/2023    Visual impairment 01/01/1992   [3] No past surgical history on file.  [4]   Family History  Problem Relation Name Age of Onset    Alcohol abuse Father Patrice Carpenter     Heart disease Father Patrice Carpenter     Diabetes Father Patrice Carpenter     Arthritis Father Patrice Carpenter     Alcohol abuse Sister Alicia Carpenter

## 2025-06-23 NOTE — LETTER
June 23, 2025       Patient: Adan Carpenter   YOB: 1985   Date of Visit: 6/23/2025        To Whom It May Concern,     Adan Carpenter was seen in our urgent care department on 6/21/2025.  Please excuse him from work 6/21/2025 to 6/23/2025.  He may return to work 6/24/2025.           Sincerely,        ALEKSANDAR Thompson

## 2025-07-18 ENCOUNTER — TELEPHONE (OUTPATIENT)
Age: 40
End: 2025-07-18

## 2025-07-18 NOTE — TELEPHONE ENCOUNTER
Returned Pt's call today @ [phone].  Left voicemail message informing Pt that Pt's Hepatology appointment on 12/30/25 with NATASHA Salvador was rescheduled with Dr. Abel due to NATASHA Salvador no longer seeing Hepatology patients effective 9/2025.  Office phone number given to Pt, via vm message, should Pt want to reschedule appointment.

## 2025-07-28 ENCOUNTER — TELEPHONE (OUTPATIENT)
Age: 40
End: 2025-07-28

## 2025-07-28 DIAGNOSIS — E11.9 TYPE 2 DIABETES MELLITUS WITHOUT COMPLICATION, WITHOUT LONG-TERM CURRENT USE OF INSULIN (HCC): Primary | ICD-10-CM

## 2025-07-30 DIAGNOSIS — E11.9 TYPE 2 DIABETES MELLITUS WITHOUT COMPLICATION, WITHOUT LONG-TERM CURRENT USE OF INSULIN (HCC): ICD-10-CM

## 2025-08-08 DIAGNOSIS — I10 PRIMARY HYPERTENSION: ICD-10-CM

## 2025-08-08 DIAGNOSIS — Z76.0 MEDICINE REFILL: ICD-10-CM

## 2025-08-08 RX ORDER — PANTOPRAZOLE SODIUM 20 MG/1
20 TABLET, DELAYED RELEASE ORAL DAILY
Qty: 90 TABLET | Refills: 0 | Status: SHIPPED | OUTPATIENT
Start: 2025-08-08

## 2025-08-08 RX ORDER — LOSARTAN POTASSIUM 50 MG/1
50 TABLET ORAL DAILY
Qty: 90 TABLET | Refills: 0 | Status: SHIPPED | OUTPATIENT
Start: 2025-08-08

## 2025-08-12 ENCOUNTER — HOSPITAL ENCOUNTER (OUTPATIENT)
Dept: RADIOLOGY | Facility: HOSPITAL | Age: 40
Discharge: HOME | End: 2025-08-12
Payer: COMMERCIAL

## 2025-08-12 ENCOUNTER — APPOINTMENT (OUTPATIENT)
Dept: LAB | Facility: HOSPITAL | Age: 40
End: 2025-08-12
Payer: COMMERCIAL

## 2025-08-18 ENCOUNTER — OFFICE VISIT (OUTPATIENT)
Dept: FAMILY MEDICINE CLINIC | Facility: CLINIC | Age: 40
End: 2025-08-18
Payer: COMMERCIAL

## 2025-08-18 VITALS
OXYGEN SATURATION: 97 % | DIASTOLIC BLOOD PRESSURE: 86 MMHG | SYSTOLIC BLOOD PRESSURE: 138 MMHG | HEART RATE: 101 BPM | BODY MASS INDEX: 30.35 KG/M2 | WEIGHT: 224.1 LBS | RESPIRATION RATE: 18 BRPM | TEMPERATURE: 97.8 F | HEIGHT: 72 IN

## 2025-08-18 DIAGNOSIS — E78.2 MIXED HYPERLIPIDEMIA: ICD-10-CM

## 2025-08-18 DIAGNOSIS — K76.0 HEPATIC STEATOSIS: ICD-10-CM

## 2025-08-18 DIAGNOSIS — E11.9 TYPE 2 DIABETES MELLITUS WITHOUT COMPLICATION, WITHOUT LONG-TERM CURRENT USE OF INSULIN (HCC): Primary | ICD-10-CM

## 2025-08-18 DIAGNOSIS — I10 PRIMARY HYPERTENSION: ICD-10-CM

## 2025-08-18 DIAGNOSIS — M79.672 LEFT FOOT PAIN: ICD-10-CM

## 2025-08-18 DIAGNOSIS — Z13.220 SCREENING FOR HYPERLIPIDEMIA: ICD-10-CM

## 2025-08-18 PROCEDURE — 99214 OFFICE O/P EST MOD 30 MIN: CPT | Performed by: FAMILY MEDICINE

## 2025-08-18 RX ORDER — ROSUVASTATIN CALCIUM 5 MG/1
5 TABLET, COATED ORAL DAILY
Qty: 100 TABLET | Refills: 0 | Status: SHIPPED | OUTPATIENT
Start: 2025-08-18